# Patient Record
Sex: FEMALE | Race: OTHER
[De-identification: names, ages, dates, MRNs, and addresses within clinical notes are randomized per-mention and may not be internally consistent; named-entity substitution may affect disease eponyms.]

---

## 2017-07-16 ENCOUNTER — HOSPITAL ENCOUNTER (EMERGENCY)
Dept: HOSPITAL 54 - ER | Age: 32
Discharge: HOME | End: 2017-07-16
Payer: COMMERCIAL

## 2017-07-16 VITALS
SYSTOLIC BLOOD PRESSURE: 95 MMHG | WEIGHT: 125 LBS | DIASTOLIC BLOOD PRESSURE: 64 MMHG | HEIGHT: 62 IN | BODY MASS INDEX: 23 KG/M2

## 2017-07-16 DIAGNOSIS — F32.9: ICD-10-CM

## 2017-07-16 DIAGNOSIS — F41.9: ICD-10-CM

## 2017-07-16 DIAGNOSIS — F17.200: ICD-10-CM

## 2017-07-16 DIAGNOSIS — Z00.8: Primary | ICD-10-CM

## 2017-07-16 DIAGNOSIS — F15.10: ICD-10-CM

## 2017-07-16 PROCEDURE — A4606 OXYGEN PROBE USED W OXIMETER: HCPCS

## 2017-07-16 PROCEDURE — Z7610: HCPCS

## 2017-07-16 NOTE — NUR
refusing to provide urine sample. marsii np made aware. medically cleared. d/c 
to pd in stable condition.

## 2017-11-14 ENCOUNTER — HOSPITAL ENCOUNTER (EMERGENCY)
Dept: HOSPITAL 54 - ER | Age: 32
Discharge: LEFT BEFORE DISCHARGE | End: 2017-11-14
Payer: COMMERCIAL

## 2017-11-14 VITALS — DIASTOLIC BLOOD PRESSURE: 62 MMHG | SYSTOLIC BLOOD PRESSURE: 100 MMHG

## 2017-11-14 VITALS — BODY MASS INDEX: 22.08 KG/M2 | HEIGHT: 62 IN | WEIGHT: 120 LBS

## 2017-11-14 DIAGNOSIS — R79.89: ICD-10-CM

## 2017-11-14 DIAGNOSIS — F15.10: ICD-10-CM

## 2017-11-14 DIAGNOSIS — R79.1: ICD-10-CM

## 2017-11-14 DIAGNOSIS — I95.9: Primary | ICD-10-CM

## 2017-11-14 DIAGNOSIS — F32.9: ICD-10-CM

## 2017-11-14 DIAGNOSIS — F41.9: ICD-10-CM

## 2017-11-14 LAB
ALBUMIN SERPL BCP-MCNC: 3.8 G/DL (ref 3.4–5)
ALP SERPL-CCNC: 65 U/L (ref 46–116)
ALT SERPL W P-5'-P-CCNC: 27 U/L (ref 12–78)
APTT PPP: 25 SEC (ref 23–34)
AST SERPL W P-5'-P-CCNC: 25 U/L (ref 15–37)
BASOPHILS # BLD AUTO: 0.1 /CMM (ref 0–0.2)
BASOPHILS NFR BLD AUTO: 0.8 % (ref 0–2)
BILIRUB DIRECT SERPL-MCNC: 0.1 MG/DL (ref 0–0.2)
BILIRUB SERPL-MCNC: 0.3 MG/DL (ref 0.2–1)
BUN SERPL-MCNC: 15 MG/DL (ref 7–18)
CALCIUM SERPL-MCNC: 9.3 MG/DL (ref 8.5–10.1)
CHLORIDE SERPL-SCNC: 104 MMOL/L (ref 98–107)
CO2 SERPL-SCNC: 26 MMOL/L (ref 21–32)
CREAT SERPL-MCNC: 0.7 MG/DL (ref 0.6–1.3)
EOSINOPHIL # BLD AUTO: 0 /CMM (ref 0–0.7)
EOSINOPHIL NFR BLD AUTO: 0.7 % (ref 0–6)
GLUCOSE SERPL-MCNC: 89 MG/DL (ref 74–106)
HCT VFR BLD AUTO: 38 % (ref 33–45)
HGB BLD-MCNC: 13.1 G/DL (ref 11.5–14.8)
INR PPP: 0.96 (ref 0.87–1.13)
LYMPHOCYTES NFR BLD AUTO: 2 /CMM (ref 0.8–4.8)
LYMPHOCYTES NFR BLD AUTO: 29 % (ref 20–44)
MCH RBC QN AUTO: 31 PG (ref 26–33)
MCHC RBC AUTO-ENTMCNC: 34 G/DL (ref 31–36)
MCV RBC AUTO: 92 FL (ref 82–100)
MONOCYTES NFR BLD AUTO: 0.3 /CMM (ref 0.1–1.3)
MONOCYTES NFR BLD AUTO: 4.8 % (ref 2–12)
NEUTROPHILS # BLD AUTO: 4.4 /CMM (ref 1.8–8.9)
NEUTROPHILS NFR BLD AUTO: 64.7 % (ref 43–81)
PLATELET # BLD AUTO: 272 /CMM (ref 150–450)
POTASSIUM SERPL-SCNC: 3.9 MMOL/L (ref 3.5–5.1)
PROT SERPL-MCNC: 7.8 G/DL (ref 6.4–8.2)
PROTHROMBIN TIME: 10 SECS (ref 9.5–12.7)
RBC # BLD AUTO: 4.18 MIL/UL (ref 4–5.2)
RDW COEFFICIENT OF VARIATION: 12.6 (ref 11.5–15)
SODIUM SERPL-SCNC: 141 MMOL/L (ref 136–145)
WBC NRBC COR # BLD AUTO: 6.8 K/UL (ref 4.3–11)

## 2017-11-14 PROCEDURE — 82962 GLUCOSE BLOOD TEST: CPT

## 2017-11-14 PROCEDURE — 80048 BASIC METABOLIC PNL TOTAL CA: CPT

## 2017-11-14 PROCEDURE — 80076 HEPATIC FUNCTION PANEL: CPT

## 2017-11-14 PROCEDURE — 71010: CPT

## 2017-11-14 PROCEDURE — 93005 ELECTROCARDIOGRAM TRACING: CPT

## 2017-11-14 PROCEDURE — Z7610: HCPCS

## 2017-11-14 PROCEDURE — 99285 EMERGENCY DEPT VISIT HI MDM: CPT

## 2017-11-14 PROCEDURE — 85730 THROMBOPLASTIN TIME PARTIAL: CPT

## 2017-11-14 PROCEDURE — A4606 OXYGEN PROBE USED W OXIMETER: HCPCS

## 2017-11-14 PROCEDURE — 85025 COMPLETE CBC W/AUTO DIFF WBC: CPT

## 2017-11-14 PROCEDURE — 36415 COLL VENOUS BLD VENIPUNCTURE: CPT

## 2017-11-14 NOTE — NUR
pt refusing to provide urine sample. b/p is 100/62. states wants to leave. 
agitated. pulled ivhl out. left ed w/out discharge paper.

## 2017-11-14 NOTE — NUR
pt peña from [facility]. was at the bathroom c/o lightheadedness. pt was 
hypotensive in the field. gowned and placedon monitor. stble vitals pta. 
awaiting md lorenz.

## 2017-11-14 NOTE — NUR
-------------------------------------------------------------------------------

          *** Note undone in [facility] - 11/14/17 at 1348 by MIN ***           

-------------------------------------------------------------------------------

 at bedside for guial.

## 2020-07-02 ENCOUNTER — HOSPITAL ENCOUNTER (EMERGENCY)
Dept: HOSPITAL 54 - ER | Age: 35
Discharge: HOME | End: 2020-07-02
Payer: SELF-PAY

## 2020-07-02 VITALS — DIASTOLIC BLOOD PRESSURE: 66 MMHG | SYSTOLIC BLOOD PRESSURE: 125 MMHG

## 2020-07-02 VITALS — BODY MASS INDEX: 23.92 KG/M2 | WEIGHT: 130 LBS | HEIGHT: 62 IN

## 2020-07-02 DIAGNOSIS — L03.116: Primary | ICD-10-CM

## 2020-07-02 DIAGNOSIS — F41.9: ICD-10-CM

## 2020-07-02 DIAGNOSIS — F32.9: ICD-10-CM

## 2020-07-02 NOTE — NUR
patient came in to the er c/o "Stepped on glass couple days ago- foot 
injury/pain". On room air, breathing evenly and unlabored. kept comfortable, 
will continue to monitor accordingly.